# Patient Record
Sex: MALE | Race: WHITE | Employment: PART TIME | URBAN - METROPOLITAN AREA
[De-identification: names, ages, dates, MRNs, and addresses within clinical notes are randomized per-mention and may not be internally consistent; named-entity substitution may affect disease eponyms.]

---

## 2021-04-28 ENCOUNTER — HOSPITAL ENCOUNTER (OUTPATIENT)
Age: 41
Discharge: HOME | End: 2021-04-30
Payer: COMMERCIAL

## 2021-04-28 DIAGNOSIS — U07.1 COVID-19: ICD-10-CM

## 2021-04-28 PROCEDURE — U0005 INFEC AGEN DETEC AMPLI PROBE: HCPCS

## 2021-04-28 PROCEDURE — U0003 INFECTIOUS AGENT DETECTION BY NUCLEIC ACID (DNA OR RNA); SEVERE ACUTE RESPIRATORY SYNDROME CORONAVIRUS 2 (SARS-COV-2) (CORONAVIRUS DISEASE [COVID-19]), AMPLIFIED PROBE TECHNIQUE, MAKING USE OF HIGH THROUGHPUT TECHNOLOGIES AS DESCRIBED BY CMS-2020-01-R: HCPCS

## 2021-04-30 LAB — SARS-COV-2, PCR: NOT DETECTED

## 2021-05-03 ENCOUNTER — HOSPITAL ENCOUNTER (OUTPATIENT)
Age: 41
Setting detail: OUTPATIENT SURGERY
Discharge: HOME | End: 2021-05-03
Attending: SURGERY | Admitting: SURGERY
Payer: COMMERCIAL

## 2021-05-03 VITALS
WEIGHT: 215 LBS | DIASTOLIC BLOOD PRESSURE: 79 MMHG | TEMPERATURE: 98 F | OXYGEN SATURATION: 96 % | HEIGHT: 70 IN | SYSTOLIC BLOOD PRESSURE: 135 MMHG | HEART RATE: 61 BPM | BODY MASS INDEX: 30.78 KG/M2 | RESPIRATION RATE: 16 BRPM

## 2021-05-03 DIAGNOSIS — U07.1 COVID-19: Primary | ICD-10-CM

## 2021-05-03 PROCEDURE — 7100000010 HC PHASE II RECOVERY - FIRST 15 MIN: Performed by: SURGERY

## 2021-05-03 PROCEDURE — 88305 TISSUE EXAM BY PATHOLOGIST: CPT

## 2021-05-03 PROCEDURE — 3609010600 HC COLONOSCOPY POLYPECTOMY SNARE/COLD BIOPSY: Performed by: SURGERY

## 2021-05-03 PROCEDURE — 3609027000 HC COLONOSCOPY: Performed by: SURGERY

## 2021-05-03 PROCEDURE — 99153 MOD SED SAME PHYS/QHP EA: CPT | Performed by: SURGERY

## 2021-05-03 PROCEDURE — 7100000011 HC PHASE II RECOVERY - ADDTL 15 MIN: Performed by: SURGERY

## 2021-05-03 PROCEDURE — 6360000002 HC RX W HCPCS: Performed by: SURGERY

## 2021-05-03 PROCEDURE — 2709999900 HC NON-CHARGEABLE SUPPLY: Performed by: SURGERY

## 2021-05-03 PROCEDURE — 99152 MOD SED SAME PHYS/QHP 5/>YRS: CPT | Performed by: SURGERY

## 2021-05-03 PROCEDURE — 3609019800 HC COLONOSCOPY WITH SUBMUCOSAL INJECTION: Performed by: SURGERY

## 2021-05-03 RX ORDER — SODIUM CHLORIDE 0.9 % (FLUSH) 0.9 %
5-40 SYRINGE (ML) INJECTION EVERY 12 HOURS SCHEDULED
Status: DISCONTINUED | OUTPATIENT
Start: 2021-05-03 | End: 2021-05-03 | Stop reason: HOSPADM

## 2021-05-03 RX ORDER — SODIUM CHLORIDE 9 MG/ML
25 INJECTION, SOLUTION INTRAVENOUS PRN
Status: DISCONTINUED | OUTPATIENT
Start: 2021-05-03 | End: 2021-05-03 | Stop reason: HOSPADM

## 2021-05-03 RX ORDER — MEPERIDINE HYDROCHLORIDE 50 MG/ML
INJECTION INTRAMUSCULAR; INTRAVENOUS; SUBCUTANEOUS PRN
Status: DISCONTINUED | OUTPATIENT
Start: 2021-05-03 | End: 2021-05-03 | Stop reason: ALTCHOICE

## 2021-05-03 RX ORDER — SODIUM CHLORIDE 0.9 % (FLUSH) 0.9 %
5-40 SYRINGE (ML) INJECTION PRN
Status: DISCONTINUED | OUTPATIENT
Start: 2021-05-03 | End: 2021-05-03 | Stop reason: HOSPADM

## 2021-05-03 RX ORDER — SODIUM CHLORIDE 9 MG/ML
INJECTION, SOLUTION INTRAVENOUS CONTINUOUS
Status: DISCONTINUED | OUTPATIENT
Start: 2021-05-03 | End: 2021-05-03 | Stop reason: HOSPADM

## 2021-05-03 RX ORDER — MIDAZOLAM HYDROCHLORIDE 1 MG/ML
INJECTION INTRAMUSCULAR; INTRAVENOUS PRN
Status: DISCONTINUED | OUTPATIENT
Start: 2021-05-03 | End: 2021-05-03 | Stop reason: ALTCHOICE

## 2021-05-03 ASSESSMENT — PAIN SCALES - GENERAL: PAINLEVEL_OUTOF10: 0

## 2021-05-03 NOTE — DISCHARGE INSTR - COC
Continuity of Care Form    Patient Name: Neelam Ramírez   :  1980  MRN:  [id number]    Admit date:  5/3/2021  Discharge date:  ***    Code Status Order: Full Code   Advance Directives:   [facility] Documentation       Date/Time Healthcare Directive Type of Healthcare Directive Copy in [address] Agent's Name Healthcare Agent's Phone Number    [phone]  No, patient does not have an advance directive for healthcare treatment -- -- -- -- --            Admitting Physician:  Brandi Pat MD  PCP: Talat Moctezuma DO    Discharging Nurse: [facility] Unit/Room#: [facility]  Discharging Unit Phone Number: ***    Emergency Contact:   Extended Emergency Contact Information  Primary Emergency Contact: QuezadaMora  Address: [address] [place] [address],[address], [address] Phone: [phone]  Relation: Parent  Secondary Emergency Contact: None,Per Pt  Relation: Other    Past Surgical History:  Past Surgical History:   Procedure Laterality Date    ANKLE SURGERY      fusion       Immunization History: There is no immunization history on file for this patient.     Active Problems:  Patient Active Problem List   Diagnosis Code    Epilepsy ([facility] [address] G40.909    History of drug abuse ([facility] [address] F19.11    Non-compliance Z91.19       Isolation/Infection:   Isolation            No Isolation          Patient Infection Status       Infection Onset Added Last Indicated Last Indicated By Review Planned Expiration Resolved Resolved By    None active    Resolved    COVID-19 Rule Out 21 Covid-19 Ambulatory (Ordered)   21 Rule-Out Test Resulted            Nurse Assessment:  Last Vital Signs: BP (!) 148/88   Pulse 75   Temp 97.8 °F (36.6 °C) (Temporal)   Resp 16   Ht 5' 10\" (1.778 m)   Wt 215 lb (97.5 kg)   SpO2 98%   BMI 30.85 kg/m²     Last documented pain score (0-10 scale): Pain Level: 0  Last Weight:   Wt Readings from Last 1 Encounters:   21 215 lb (97.5 kg)     Mental Status:  {IP PT MENTAL STATUS::::0}    IV Access:  { BUDDY IV ACCESS:765133216:::0}    Nursing Mobility/ADLs:  Walking   {CHP DME ADLs:961945610:::0}  Transfer  {CHP DME ADLs:848780623:::0}  Bathing  {CHP DME ADLs:693260534:::0}  Dressing  {CHP DME ADLs:701147424:::0}  Toileting  {CHP DME ADLs:454010910:::0}  Feeding  {CHP DME ADLs:481090742:::0}  Med Admin  {CHP DME ADLs:247017775:::0}  Med Delivery   { BUDDY MED Delivery:031029152:::0}    Wound Care Documentation and Therapy:        Elimination:  Continence: Bowel: {YES / ME:30350}  Bladder: {YES / SR:55379}  Urinary Catheter: {Urinary Catheter:265790139:::0}   Colostomy/Ileostomy/Ileal Conduit: {YES / CP:96641}       Date of Last BM: ***  No intake or output data in the 24 hours ending 21 1111  No intake/output data recorded.     Safety Concerns:     [address] [employer] Safety Concerns:528361581:::0}    Impairments/Disabilities:      [employer] Impairments/Disabilities:464936765:::0}    Nutrition Therapy:  Current Nutrition Therapy:   [employer] Diet List:948048053:::0}    Routes of Feeding: {CHP DME Other Feedings:795746511:::0}  Liquids: {Slp liquid thickness:93915}  Daily Fluid Restriction: {CHP DME Yes amt example:746078043:::0}  Last Modified Barium Swallow with Video (Video Swallowing Test): {Done Not Done PGBB:333737062:::0}    Treatments at the Time of Hospital Discharge:   Respiratory Treatments: ***  Oxygen Therapy:  {Therapy; copd oxygen:87719:::0}  Ventilator:    { CC Vent List:453979227:::0}    Rehab Therapies: {THERAPEUTIC INTERVENTION:3069847660}  Weight Bearing Status/Restrictions: Pam TRISTAN Weight Bearin:::0}  Other Medical Equipment (for information only, NOT a DME order):  {EQUIPMENT:328624446}  Other Treatments: ***    Patient's personal belongings (please select all that are sent with patient):  {CHP DME Belongings:029008951:::0}    RN SIGNATURE: {Esignature:036745537:::0}    CASE MANAGEMENT/SOCIAL WORK SECTION    Inpatient Status Date: ***    Readmission Risk Assessment Score:  Readmission Risk              Risk of Unplanned Readmission:        0           Discharging to Facility/ Agency   Name:   Address:  Phone:  Fax:    Dialysis Facility (if applicable)   Name:  Address:  Dialysis Schedule:  Phone:  Fax:    / signature: {Esignature:321433089:::0}    PHYSICIAN SECTION    Prognosis: {Prognosis:6385660626:::0}    Condition at Discharge: [address] [place] [address] Patient Condition:318218640:::0}    Rehab Potential (if transferring to Rehab): {Prognosis:7853595563:::0}    Recommended Labs or Other Treatments After Discharge: ***    Physician Certification: I certify the above information and transfer of Abdoul Hunt  is necessary for the continuing treatment of the diagnosis listed and that he requires {Admit to Appropriate Level of Care:55450:::0} for {GREATER/LESS:438258926} 30 days.      Update Admission H&P: {CHP DME Changes in HandP:142580589:::0}    PHYSICIAN SIGNATURE:  {Esignature:455165610:::0}

## 2021-05-03 NOTE — DISCHARGE INSTR - DIET

## 2021-05-03 NOTE — PROGRESS NOTES
Covid Test done: 04/28/2021    Results: Not detected    Self-quarantine guidelines followed since tested? Yes    Any unusual S/S or concerns expressed or observed?  No

## 2021-05-03 NOTE — BRIEF OP NOTE
Brief Postoperative Note      Patient: Neelam Ramírez  YOB: 1980  MRN: [id number]    Date of Procedure: 5/3/2021    Pre-Op Diagnosis: RECTAL BLEED    Post-Op Diagnosis: Same       Procedure(s):  COLONOSCOPY DIAGNOSTIC BOTOX INJECTION    Surgeon(s):  Brandi Pat MD    Assistant:  * No surgical staff found *    Anesthesia: IV Sedation    Estimated Blood Loss (mL): Minimal    Complications: None    Specimens:   ID Type Source Tests Collected by Time Destination   A : Cecal Polyp Tissue Colon SURGICAL PATHOLOGY Brandi Pat MD 5/3/2021 1053        Implants:  * No implants in log *      Drains: * No LDAs found *    Findings: cecal biopsies and rectal Botox injection 100 U 3 split doses    Electronically signed by Brandi Pat MD on 5/3/2021 at 11:08 AM

## 2021-05-03 NOTE — PRE SEDATION
Sedation Pre-Procedure Note    Patient Name: Renee Reece   YOB: 1980  Room/Bed: [facility] ROOM/NONE  Medical Record Number: [id number]  Date: 5/3/2021   Time: 11:07 AM       Indication:  GI bleed and fissures    Consent: I have discussed with the patient and/or the patient representative the indication, alternatives, and the possible risks and/or complications of the planned procedure and the anesthesia methods. The patient and/or patient representative appear to understand and agree to proceed. Vital Signs:   Vitals:    05/03/21 0937   BP: (!) 148/88   Pulse: 75   Resp: 16   Temp: 97.8 °F (36.6 °C)   SpO2: 98%       Past Medical History:   has a past medical history of Seizures ([facility] [address] Past Surgical History:   has a past surgical history that includes Ankle surgery. Medications:   Scheduled Meds:    sodium chloride flush  5-40 mL Intravenous 2 times per day    onabotulinumtoxin A  100 Units Intramuscular Once     Continuous Infusions:    sodium chloride      sodium chloride       PRN Meds: sodium chloride, sodium chloride flush  Home Meds:   Prior to Admission medications    Medication Sig Start Date End Date Taking? Authorizing Provider   lacosamide (VIMPAT) 100 MG TABS tablet TAKE TWO TABLETS BY MOUTH TWICE DAILY 3/4/14  Yes Abad Lee MD     Coumadin Use Last 7 Days:  no  Antiplatelet drug therapy use last 7 days: no  Other anticoagulant use last 7 days: no  Additional Medication Information:  none      Pre-Sedation Documentation and Exam:   I have personally completed a history, physical exam & review of systems for this patient (see notes).     Mallampati Airway Assessment:  normal    Prior History of Anesthesia Complications:   none    ASA Classification:  Class 2 - A normal healthy patient with mild systemic disease    Sedation/ Anesthesia Plan:   intravenous sedation    Medications Planned:   midazolam (Versed)  intravenously    Patient is an appropriate candidate for plan of sedation: yes    Electronically signed by Jean Marie Cardoso MD on 5/3/2021 at 11:07 AM

## 2021-05-04 NOTE — PROCEDURES
[address]                  [address] [place],  [facility]                                 PROCEDURE NOTE    PATIENT NAME: Celso Hallman                    :        1980  MED REC NO:   [id number]                            ROOM:  ACCOUNT NO:   [de-identified]                           ADMIT DATE: 2021  PROVIDER:     Andrei Cheema MD    DATE OF PROCEDURE:  2021    SURGEON:  Andrei Cheema MD    PREPROCEDURE DIAGNOSIS:  Rectal bleed secondary to anal fissures. PROCEDURES:  Consisted of colonoscopy, cecal biopsies, and Botox  injection three-quadrant to the circular rectal area. DESCRIPTION OF PROCEDURE:  The procedure was carried out under awake  sedation. The patient left in supine position. Rectal examination was  performed, but could not be completed due to the discomfort. After  adequate lubrication, the colonoscope was then introduced and progressed  without difficulty through sigmoid, descending, transverse and ascending  colon. There were mild inflammatory changes noted in the ascending area  and biopsies were taken. Ileocecal valve was well visualized and with  the patient in supine position, endoscope was progressively withdrawn  with similar findings. After which 100 units of Botox were split in  three portions injected in the posterior anal area as well as the right  lateral and left lateral for a total of 100 units. The patient  tolerated the procedure well, and the patient was discharged from the  endoscopy suite in stable condition to be monitored as an outpatient for  further management as needed.   EBL 0      Linda Keen MD    D: 2021 15:43:16       T: 2021 15:46:51     [email or web address]  Job#: [id number]     Doc#: [id number]    CC:

## 2021-10-29 ENCOUNTER — HOSPITAL ENCOUNTER (OUTPATIENT)
Age: 41
Discharge: HOME | End: 2021-10-31
Payer: COMMERCIAL

## 2021-10-29 DIAGNOSIS — Z01.818 PREOP TESTING: ICD-10-CM

## 2021-10-29 PROCEDURE — U0003 INFECTIOUS AGENT DETECTION BY NUCLEIC ACID (DNA OR RNA); SEVERE ACUTE RESPIRATORY SYNDROME CORONAVIRUS 2 (SARS-COV-2) (CORONAVIRUS DISEASE [COVID-19]), AMPLIFIED PROBE TECHNIQUE, MAKING USE OF HIGH THROUGHPUT TECHNOLOGIES AS DESCRIBED BY CMS-2020-01-R: HCPCS

## 2021-10-31 LAB — SARS-COV-2, PCR: NOT DETECTED

## 2021-11-01 NOTE — PROGRESS NOTES
Have you been tested for COVID  Yes      negative     Have you been told you were positive for COVID No  Have you had any known exposure to someone that is positive for COVID No  Do you have a cough                   No              Do you have shortness of breath No                 Do you have a sore throat            No                Are you having chills                    No                Are you having muscle aches. No                    Please come to the hospital wearing a mask and have your significant other wear a mask as well. Both of you should check your temperature before leaving to come here,  if it is 100 or higher please call [phone] for instruction.

## 2021-11-01 NOTE — PROGRESS NOTES
procedure to notify you if your arrival time changes    [x] If you receive a survey after surgery we would greatly appreciate your comments    [] Parent/guardian of a minor must accompany their child and remain on the premises  the entire time they are under our care     [] Pediatric patients may bring favorite toy, blanket or comfort item with them    [] A caregiver or family member must remain with the patient during their stay if they are mentally handicapped, have dementia, disoriented or unable to use a call light or would be a safety concern if left unattended    [x] Please notify surgeon if you develop any illness between now and time of surgery (cold, cough, sore throat, fever, nausea, vomiting) or any signs of infections  including skin, wounds, and dental.    [x]  The Outpatient Pharmacy is available to fill your prescription here on your day of surgery, ask your preop nurse for details    [x] Other instructions: wear loose, comfortable clothing  EDUCATIONAL MATERIALS PROVIDED:    [] PAT Preoperative Education Packet/Booklet     [] Medication List    [] Transfusion bracelet applied with instructions    [] Shower with soap, lather and rinse well, and use CHG wipes provided the evening before surgery as instructed    [] Incentive spirometer with instructions

## 2021-11-03 ENCOUNTER — HOSPITAL ENCOUNTER (OUTPATIENT)
Age: 41
Setting detail: OBSERVATION
Discharge: HOME | End: 2021-11-04
Attending: ORTHOPAEDIC SURGERY | Admitting: ORTHOPAEDIC SURGERY
Payer: COMMERCIAL

## 2021-11-03 ENCOUNTER — HOSPITAL ENCOUNTER (OUTPATIENT)
Dept: GENERAL RADIOLOGY | Age: 41
Discharge: HOME | End: 2021-11-05
Attending: ORTHOPAEDIC SURGERY
Payer: COMMERCIAL

## 2021-11-03 ENCOUNTER — ANESTHESIA EVENT (OUTPATIENT)
Dept: OPERATING ROOM | Age: 41
End: 2021-11-03
Payer: COMMERCIAL

## 2021-11-03 ENCOUNTER — ANESTHESIA (OUTPATIENT)
Dept: OPERATING ROOM | Age: 41
End: 2021-11-03
Payer: COMMERCIAL

## 2021-11-03 VITALS — DIASTOLIC BLOOD PRESSURE: 77 MMHG | SYSTOLIC BLOOD PRESSURE: 167 MMHG | OXYGEN SATURATION: 92 % | TEMPERATURE: 98.1 F

## 2021-11-03 DIAGNOSIS — G89.18 POST-OP PAIN: ICD-10-CM

## 2021-11-03 DIAGNOSIS — R52 PAIN: ICD-10-CM

## 2021-11-03 DIAGNOSIS — Z01.818 PREOP TESTING: Primary | ICD-10-CM

## 2021-11-03 PROBLEM — M19.071 ARTHRITIS OF RIGHT FOOT: Status: ACTIVE | Noted: 2021-11-03

## 2021-11-03 LAB
EKG ATRIAL RATE: 59 BPM
EKG P AXIS: 9 DEGREES
EKG P-R INTERVAL: 156 MS
EKG Q-T INTERVAL: 410 MS
EKG QRS DURATION: 92 MS
EKG QTC CALCULATION (BAZETT): 405 MS
EKG R AXIS: 19 DEGREES
EKG T AXIS: 30 DEGREES
EKG VENTRICULAR RATE: 59 BPM
HCT VFR BLD CALC: 47.8 % (ref 37–54)
HEMOGLOBIN: 16.6 G/DL (ref 12.5–16.5)
MCH RBC QN AUTO: 31.5 PG (ref 26–35)
MCHC RBC AUTO-ENTMCNC: 34.7 % (ref 32–34.5)
MCV RBC AUTO: 90.7 FL (ref 80–99.9)
PDW BLD-RTO: 11.7 FL (ref 11.5–15)
PLATELET # BLD: 278 E9/L (ref 130–450)
PMV BLD AUTO: 9 FL (ref 7–12)
RBC # BLD: 5.27 E12/L (ref 3.8–5.8)
WBC # BLD: 7.8 E9/L (ref 4.5–11.5)

## 2021-11-03 PROCEDURE — 76942 ECHO GUIDE FOR BIOPSY: CPT | Performed by: ANESTHESIOLOGY

## 2021-11-03 PROCEDURE — 3600000014 HC SURGERY LEVEL 4 ADDTL 15MIN: Performed by: ORTHOPAEDIC SURGERY

## 2021-11-03 PROCEDURE — 3700000001 HC ADD 15 MINUTES (ANESTHESIA): Performed by: ORTHOPAEDIC SURGERY

## 2021-11-03 PROCEDURE — 93005 ELECTROCARDIOGRAM TRACING: CPT

## 2021-11-03 PROCEDURE — 64445 NJX AA&/STRD SCIATIC NRV IMG: CPT | Performed by: ANESTHESIOLOGY

## 2021-11-03 PROCEDURE — G0378 HOSPITAL OBSERVATION PER HR: HCPCS

## 2021-11-03 PROCEDURE — 3209999900 FLUORO FOR SURGICAL PROCEDURES

## 2021-11-03 PROCEDURE — 7100000000 HC PACU RECOVERY - FIRST 15 MIN: Performed by: ORTHOPAEDIC SURGERY

## 2021-11-03 PROCEDURE — C1769 GUIDE WIRE: HCPCS | Performed by: ORTHOPAEDIC SURGERY

## 2021-11-03 PROCEDURE — 2500000003 HC RX 250 WO HCPCS: Performed by: ANESTHESIOLOGY

## 2021-11-03 PROCEDURE — 2720000002 HC MISC SURG SUPPLY STERILE >$500: Performed by: ORTHOPAEDIC SURGERY

## 2021-11-03 PROCEDURE — 85027 COMPLETE CBC AUTOMATED: CPT

## 2021-11-03 PROCEDURE — 6360000002 HC RX W HCPCS: Performed by: ORTHOPAEDIC SURGERY

## 2021-11-03 PROCEDURE — 2720000010 HC SURG SUPPLY STERILE: Performed by: ORTHOPAEDIC SURGERY

## 2021-11-03 PROCEDURE — 2580000003 HC RX 258: Performed by: NURSE ANESTHETIST, CERTIFIED REGISTERED

## 2021-11-03 PROCEDURE — 3600000004 HC SURGERY LEVEL 4 BASE: Performed by: ORTHOPAEDIC SURGERY

## 2021-11-03 PROCEDURE — C1713 ANCHOR/SCREW BN/BN,TIS/BN: HCPCS | Performed by: ORTHOPAEDIC SURGERY

## 2021-11-03 PROCEDURE — 6370000000 HC RX 637 (ALT 250 FOR IP): Performed by: ANESTHESIOLOGY

## 2021-11-03 PROCEDURE — 2580000003 HC RX 258: Performed by: ORTHOPAEDIC SURGERY

## 2021-11-03 PROCEDURE — 36415 COLL VENOUS BLD VENIPUNCTURE: CPT

## 2021-11-03 PROCEDURE — 6360000002 HC RX W HCPCS: Performed by: ANESTHESIOLOGY

## 2021-11-03 PROCEDURE — 7100000001 HC PACU RECOVERY - ADDTL 15 MIN: Performed by: ORTHOPAEDIC SURGERY

## 2021-11-03 PROCEDURE — 6360000002 HC RX W HCPCS: Performed by: NURSE ANESTHETIST, CERTIFIED REGISTERED

## 2021-11-03 PROCEDURE — C1729 CATH, DRAINAGE: HCPCS | Performed by: ORTHOPAEDIC SURGERY

## 2021-11-03 PROCEDURE — 3700000000 HC ANESTHESIA ATTENDED CARE: Performed by: ORTHOPAEDIC SURGERY

## 2021-11-03 PROCEDURE — 2709999900 HC NON-CHARGEABLE SUPPLY: Performed by: ORTHOPAEDIC SURGERY

## 2021-11-03 PROCEDURE — 2500000003 HC RX 250 WO HCPCS: Performed by: NURSE ANESTHETIST, CERTIFIED REGISTERED

## 2021-11-03 PROCEDURE — 6370000000 HC RX 637 (ALT 250 FOR IP): Performed by: ORTHOPAEDIC SURGERY

## 2021-11-03 DEVICE — SCREW BNE L80MM DIA7MM XL HDLSS COMPR FULL THRD: Type: IMPLANTABLE DEVICE | Status: FUNCTIONAL

## 2021-11-03 DEVICE — SCREW BNE XL L75MM DIA7MM HDLSS COMPR FULL THRD: Type: IMPLANTABLE DEVICE | Status: FUNCTIONAL

## 2021-11-03 DEVICE — IMPLANTABLE DEVICE: Type: IMPLANTABLE DEVICE | Status: FUNCTIONAL

## 2021-11-03 DEVICE — STAPLE BONE FIX W20XL20MM NIT COMPR W/ INSTRMT LO PROF FOR: Type: IMPLANTABLE DEVICE | Site: ANKLE | Status: FUNCTIONAL

## 2021-11-03 RX ORDER — MORPHINE SULFATE 2 MG/ML
2 INJECTION, SOLUTION INTRAMUSCULAR; INTRAVENOUS EVERY 4 HOURS PRN
Status: DISCONTINUED | OUTPATIENT
Start: 2021-11-03 | End: 2021-11-04 | Stop reason: HOSPADM

## 2021-11-03 RX ORDER — ROPIVACAINE HYDROCHLORIDE 5 MG/ML
INJECTION, SOLUTION EPIDURAL; INFILTRATION; PERINEURAL
Status: COMPLETED | OUTPATIENT
Start: 2021-11-03 | End: 2021-11-03

## 2021-11-03 RX ORDER — ONDANSETRON 2 MG/ML
4 INJECTION INTRAMUSCULAR; INTRAVENOUS EVERY 6 HOURS PRN
Status: DISCONTINUED | OUTPATIENT
Start: 2021-11-03 | End: 2021-11-04 | Stop reason: HOSPADM

## 2021-11-03 RX ORDER — SODIUM CHLORIDE 0.9 % (FLUSH) 0.9 %
5-40 SYRINGE (ML) INJECTION EVERY 12 HOURS SCHEDULED
Status: DISCONTINUED | OUTPATIENT
Start: 2021-11-03 | End: 2021-11-04 | Stop reason: HOSPADM

## 2021-11-03 RX ORDER — LACOSAMIDE 100 MG/1
200 TABLET ORAL 2 TIMES DAILY
Status: DISCONTINUED | OUTPATIENT
Start: 2021-11-03 | End: 2021-11-04 | Stop reason: HOSPADM

## 2021-11-03 RX ORDER — SODIUM CHLORIDE 9 MG/ML
25 INJECTION, SOLUTION INTRAVENOUS PRN
Status: DISCONTINUED | OUTPATIENT
Start: 2021-11-03 | End: 2021-11-04 | Stop reason: HOSPADM

## 2021-11-03 RX ORDER — LIDOCAINE HYDROCHLORIDE 20 MG/ML
INJECTION, SOLUTION INFILTRATION; PERINEURAL PRN
Status: DISCONTINUED | OUTPATIENT
Start: 2021-11-03 | End: 2021-11-03 | Stop reason: SDUPTHER

## 2021-11-03 RX ORDER — HYDROCODONE BITARTRATE AND ACETAMINOPHEN 5; 325 MG/1; MG/1
1 TABLET ORAL EVERY 6 HOURS PRN
Qty: 28 TABLET | Refills: 0 | Status: SHIPPED | OUTPATIENT
Start: 2021-11-03 | End: 2021-11-10

## 2021-11-03 RX ORDER — SODIUM CHLORIDE 0.9 % (FLUSH) 0.9 %
5-40 SYRINGE (ML) INJECTION PRN
Status: DISCONTINUED | OUTPATIENT
Start: 2021-11-03 | End: 2021-11-04 | Stop reason: HOSPADM

## 2021-11-03 RX ORDER — SODIUM CHLORIDE 9 MG/ML
INJECTION, SOLUTION INTRAVENOUS CONTINUOUS PRN
Status: DISCONTINUED | OUTPATIENT
Start: 2021-11-03 | End: 2021-11-03 | Stop reason: SDUPTHER

## 2021-11-03 RX ORDER — ONDANSETRON 2 MG/ML
INJECTION INTRAMUSCULAR; INTRAVENOUS PRN
Status: DISCONTINUED | OUTPATIENT
Start: 2021-11-03 | End: 2021-11-03 | Stop reason: SDUPTHER

## 2021-11-03 RX ORDER — ROCURONIUM BROMIDE 10 MG/ML
INJECTION, SOLUTION INTRAVENOUS PRN
Status: DISCONTINUED | OUTPATIENT
Start: 2021-11-03 | End: 2021-11-03 | Stop reason: SDUPTHER

## 2021-11-03 RX ORDER — MEPERIDINE HYDROCHLORIDE 25 MG/ML
12.5 INJECTION INTRAMUSCULAR; INTRAVENOUS; SUBCUTANEOUS EVERY 5 MIN PRN
Status: DISCONTINUED | OUTPATIENT
Start: 2021-11-03 | End: 2021-11-03 | Stop reason: HOSPADM

## 2021-11-03 RX ORDER — ACETAMINOPHEN 325 MG/1
650 TABLET ORAL EVERY 4 HOURS PRN
Status: DISCONTINUED | OUTPATIENT
Start: 2021-11-03 | End: 2021-11-04 | Stop reason: HOSPADM

## 2021-11-03 RX ORDER — FENTANYL CITRATE 50 UG/ML
INJECTION, SOLUTION INTRAMUSCULAR; INTRAVENOUS PRN
Status: DISCONTINUED | OUTPATIENT
Start: 2021-11-03 | End: 2021-11-03 | Stop reason: SDUPTHER

## 2021-11-03 RX ORDER — OXYCODONE HYDROCHLORIDE 5 MG/1
5 TABLET ORAL EVERY 4 HOURS PRN
Status: DISCONTINUED | OUTPATIENT
Start: 2021-11-03 | End: 2021-11-04 | Stop reason: HOSPADM

## 2021-11-03 RX ORDER — MIDAZOLAM HYDROCHLORIDE 2 MG/2ML
0.5 INJECTION, SOLUTION INTRAMUSCULAR; INTRAVENOUS PRN
Status: DISCONTINUED | OUTPATIENT
Start: 2021-11-03 | End: 2021-11-03 | Stop reason: HOSPADM

## 2021-11-03 RX ORDER — ROPIVACAINE HYDROCHLORIDE 5 MG/ML
30 INJECTION, SOLUTION EPIDURAL; INFILTRATION; PERINEURAL
Status: DISCONTINUED | OUTPATIENT
Start: 2021-11-03 | End: 2021-11-03 | Stop reason: HOSPADM

## 2021-11-03 RX ORDER — DEXAMETHASONE SODIUM PHOSPHATE 4 MG/ML
INJECTION, SOLUTION INTRA-ARTICULAR; INTRALESIONAL; INTRAMUSCULAR; INTRAVENOUS; SOFT TISSUE PRN
Status: DISCONTINUED | OUTPATIENT
Start: 2021-11-03 | End: 2021-11-03 | Stop reason: SDUPTHER

## 2021-11-03 RX ORDER — PROPOFOL 10 MG/ML
INJECTION, EMULSION INTRAVENOUS PRN
Status: DISCONTINUED | OUTPATIENT
Start: 2021-11-03 | End: 2021-11-03 | Stop reason: SDUPTHER

## 2021-11-03 RX ORDER — ONDANSETRON 4 MG/1
4 TABLET, ORALLY DISINTEGRATING ORAL EVERY 8 HOURS PRN
Status: DISCONTINUED | OUTPATIENT
Start: 2021-11-03 | End: 2021-11-04 | Stop reason: HOSPADM

## 2021-11-03 RX ORDER — PROCHLORPERAZINE EDISYLATE 5 MG/ML
5 INJECTION INTRAMUSCULAR; INTRAVENOUS
Status: DISCONTINUED | OUTPATIENT
Start: 2021-11-03 | End: 2021-11-03 | Stop reason: HOSPADM

## 2021-11-03 RX ORDER — METOCLOPRAMIDE HYDROCHLORIDE 5 MG/ML
10 INJECTION INTRAMUSCULAR; INTRAVENOUS ONCE
Status: COMPLETED | OUTPATIENT
Start: 2021-11-03 | End: 2021-11-03

## 2021-11-03 RX ORDER — OXYCODONE HYDROCHLORIDE 5 MG/1
10 TABLET ORAL EVERY 4 HOURS PRN
Status: DISCONTINUED | OUTPATIENT
Start: 2021-11-03 | End: 2021-11-04 | Stop reason: HOSPADM

## 2021-11-03 RX ORDER — FENTANYL CITRATE 50 UG/ML
25 INJECTION, SOLUTION INTRAMUSCULAR; INTRAVENOUS PRN
Status: DISCONTINUED | OUTPATIENT
Start: 2021-11-03 | End: 2021-11-03 | Stop reason: HOSPADM

## 2021-11-03 RX ORDER — DIPHENHYDRAMINE HYDROCHLORIDE 50 MG/ML
12.5 INJECTION INTRAMUSCULAR; INTRAVENOUS
Status: DISCONTINUED | OUTPATIENT
Start: 2021-11-03 | End: 2021-11-03 | Stop reason: HOSPADM

## 2021-11-03 RX ORDER — POLYETHYLENE GLYCOL 3350 17 G/17G
17 POWDER, FOR SOLUTION ORAL DAILY PRN
Status: DISCONTINUED | OUTPATIENT
Start: 2021-11-03 | End: 2021-11-04 | Stop reason: HOSPADM

## 2021-11-03 RX ORDER — FENTANYL CITRATE 50 UG/ML
25 INJECTION, SOLUTION INTRAMUSCULAR; INTRAVENOUS EVERY 5 MIN PRN
Status: DISCONTINUED | OUTPATIENT
Start: 2021-11-03 | End: 2021-11-03 | Stop reason: HOSPADM

## 2021-11-03 RX ORDER — MORPHINE SULFATE 2 MG/ML
4 INJECTION, SOLUTION INTRAMUSCULAR; INTRAVENOUS EVERY 4 HOURS PRN
Status: DISCONTINUED | OUTPATIENT
Start: 2021-11-03 | End: 2021-11-04 | Stop reason: HOSPADM

## 2021-11-03 RX ORDER — HYDROCODONE BITARTRATE AND ACETAMINOPHEN 5; 325 MG/1; MG/1
1 TABLET ORAL
Status: DISCONTINUED | OUTPATIENT
Start: 2021-11-03 | End: 2021-11-03 | Stop reason: HOSPADM

## 2021-11-03 RX ORDER — ACETAMINOPHEN 500 MG
1000 TABLET ORAL ONCE
Status: COMPLETED | OUTPATIENT
Start: 2021-11-03 | End: 2021-11-03

## 2021-11-03 RX ADMIN — MORPHINE SULFATE 4 MG: 2 INJECTION, SOLUTION INTRAMUSCULAR; INTRAVENOUS at 20:50

## 2021-11-03 RX ADMIN — FENTANYL CITRATE 50 MCG: 50 INJECTION, SOLUTION INTRAMUSCULAR; INTRAVENOUS at 12:03

## 2021-11-03 RX ADMIN — HYDROMORPHONE HYDROCHLORIDE 0.5 MG: 1 INJECTION, SOLUTION INTRAMUSCULAR; INTRAVENOUS; SUBCUTANEOUS at 18:26

## 2021-11-03 RX ADMIN — LACOSAMIDE 200 MG: 100 TABLET, FILM COATED ORAL at 20:41

## 2021-11-03 RX ADMIN — ONDANSETRON 4 MG: 2 INJECTION INTRAMUSCULAR; INTRAVENOUS at 13:31

## 2021-11-03 RX ADMIN — LIDOCAINE HYDROCHLORIDE 60 MG: 20 INJECTION, SOLUTION INFILTRATION; PERINEURAL at 13:31

## 2021-11-03 RX ADMIN — FENTANYL CITRATE 100 MCG: 50 INJECTION, SOLUTION INTRAMUSCULAR; INTRAVENOUS at 13:31

## 2021-11-03 RX ADMIN — ROPIVACAINE HYDROCHLORIDE 25 ML: 5 INJECTION, SOLUTION EPIDURAL; INFILTRATION; PERINEURAL at 12:09

## 2021-11-03 RX ADMIN — ROCURONIUM BROMIDE 50 MG: 10 INJECTION, SOLUTION INTRAVENOUS at 13:31

## 2021-11-03 RX ADMIN — SODIUM CHLORIDE: 9 INJECTION, SOLUTION INTRAVENOUS at 13:23

## 2021-11-03 RX ADMIN — FAMOTIDINE 20 MG: 10 INJECTION, SOLUTION INTRAVENOUS at 11:27

## 2021-11-03 RX ADMIN — Medication 2000 MG: at 13:25

## 2021-11-03 RX ADMIN — SODIUM CHLORIDE: 9 INJECTION, SOLUTION INTRAVENOUS at 16:38

## 2021-11-03 RX ADMIN — ROPIVACAINE HYDROCHLORIDE 20 ML: 5 INJECTION, SOLUTION EPIDURAL; INFILTRATION; PERINEURAL at 12:07

## 2021-11-03 RX ADMIN — Medication 2000 MG: at 17:20

## 2021-11-03 RX ADMIN — PROPOFOL 190 MG: 10 INJECTION, EMULSION INTRAVENOUS at 13:31

## 2021-11-03 RX ADMIN — MIDAZOLAM 2 MG: 1 INJECTION INTRAMUSCULAR; INTRAVENOUS at 12:03

## 2021-11-03 RX ADMIN — ACETAMINOPHEN 1000 MG: 500 TABLET ORAL at 11:26

## 2021-11-03 RX ADMIN — Medication 10 ML: at 21:05

## 2021-11-03 RX ADMIN — DEXAMETHASONE SODIUM PHOSPHATE 8 MG: 4 INJECTION, SOLUTION INTRAMUSCULAR; INTRAVENOUS at 13:31

## 2021-11-03 RX ADMIN — METOCLOPRAMIDE 10 MG: 5 INJECTION, SOLUTION INTRAMUSCULAR; INTRAVENOUS at 11:27

## 2021-11-03 ASSESSMENT — PULMONARY FUNCTION TESTS
PIF_VALUE: 3
PIF_VALUE: 20
PIF_VALUE: 21
PIF_VALUE: 0
PIF_VALUE: 21
PIF_VALUE: 22
PIF_VALUE: 21
PIF_VALUE: 21
PIF_VALUE: 22
PIF_VALUE: 6
PIF_VALUE: 21
PIF_VALUE: 20
PIF_VALUE: 21
PIF_VALUE: 4
PIF_VALUE: 21
PIF_VALUE: 21
PIF_VALUE: 3
PIF_VALUE: 21
PIF_VALUE: 21
PIF_VALUE: 20
PIF_VALUE: 20
PIF_VALUE: 21
PIF_VALUE: 20
PIF_VALUE: 3
PIF_VALUE: 21
PIF_VALUE: 20
PIF_VALUE: 23
PIF_VALUE: 3
PIF_VALUE: 20
PIF_VALUE: 19
PIF_VALUE: 20
PIF_VALUE: 21
PIF_VALUE: 21
PIF_VALUE: 23
PIF_VALUE: 20
PIF_VALUE: 20
PIF_VALUE: 21
PIF_VALUE: 3
PIF_VALUE: 5
PIF_VALUE: 3
PIF_VALUE: 4
PIF_VALUE: 21
PIF_VALUE: 22
PIF_VALUE: 1
PIF_VALUE: 21
PIF_VALUE: 0
PIF_VALUE: 21
PIF_VALUE: 20
PIF_VALUE: 19
PIF_VALUE: 22
PIF_VALUE: 4
PIF_VALUE: 22
PIF_VALUE: 21
PIF_VALUE: 21
PIF_VALUE: 22
PIF_VALUE: 21
PIF_VALUE: 21
PIF_VALUE: 3
PIF_VALUE: 1
PIF_VALUE: 21
PIF_VALUE: 24
PIF_VALUE: 20
PIF_VALUE: 3
PIF_VALUE: 21
PIF_VALUE: 22
PIF_VALUE: 22
PIF_VALUE: 3
PIF_VALUE: 1
PIF_VALUE: 22
PIF_VALUE: 1
PIF_VALUE: 0
PIF_VALUE: 20
PIF_VALUE: 0
PIF_VALUE: 21
PIF_VALUE: 4
PIF_VALUE: 3
PIF_VALUE: 0
PIF_VALUE: 21
PIF_VALUE: 22
PIF_VALUE: 22
PIF_VALUE: 3
PIF_VALUE: 21
PIF_VALUE: 22
PIF_VALUE: 21
PIF_VALUE: 2
PIF_VALUE: 21
PIF_VALUE: 3
PIF_VALUE: 3
PIF_VALUE: 22
PIF_VALUE: 21
PIF_VALUE: 22
PIF_VALUE: 21
PIF_VALUE: 21
PIF_VALUE: 22
PIF_VALUE: 4
PIF_VALUE: 3
PIF_VALUE: 21
PIF_VALUE: 19
PIF_VALUE: 2
PIF_VALUE: 0
PIF_VALUE: 21
PIF_VALUE: 22
PIF_VALUE: 21
PIF_VALUE: 22
PIF_VALUE: 21
PIF_VALUE: 3
PIF_VALUE: 3
PIF_VALUE: 22
PIF_VALUE: 20
PIF_VALUE: 22
PIF_VALUE: 21
PIF_VALUE: 21
PIF_VALUE: 20
PIF_VALUE: 21
PIF_VALUE: 21
PIF_VALUE: 3
PIF_VALUE: 21
PIF_VALUE: 22
PIF_VALUE: 21
PIF_VALUE: 3
PIF_VALUE: 21
PIF_VALUE: 20
PIF_VALUE: 21
PIF_VALUE: 21
PIF_VALUE: 3
PIF_VALUE: 3
PIF_VALUE: 21
PIF_VALUE: 21
PIF_VALUE: 4
PIF_VALUE: 21
PIF_VALUE: 3
PIF_VALUE: 21
PIF_VALUE: 4
PIF_VALUE: 4
PIF_VALUE: 21
PIF_VALUE: 4
PIF_VALUE: 21
PIF_VALUE: 3
PIF_VALUE: 21
PIF_VALUE: 3
PIF_VALUE: 21
PIF_VALUE: 22
PIF_VALUE: 21
PIF_VALUE: 4
PIF_VALUE: 3
PIF_VALUE: 2
PIF_VALUE: 0
PIF_VALUE: 4
PIF_VALUE: 21
PIF_VALUE: 3
PIF_VALUE: 3
PIF_VALUE: 23
PIF_VALUE: 23
PIF_VALUE: 3
PIF_VALUE: 20
PIF_VALUE: 22
PIF_VALUE: 3
PIF_VALUE: 23
PIF_VALUE: 23
PIF_VALUE: 0
PIF_VALUE: 3
PIF_VALUE: 3
PIF_VALUE: 21
PIF_VALUE: 21
PIF_VALUE: 0
PIF_VALUE: 20
PIF_VALUE: 21
PIF_VALUE: 3
PIF_VALUE: 21
PIF_VALUE: 21
PIF_VALUE: 3
PIF_VALUE: 3
PIF_VALUE: 4
PIF_VALUE: 22
PIF_VALUE: 4
PIF_VALUE: 3
PIF_VALUE: 21
PIF_VALUE: 21
PIF_VALUE: 3
PIF_VALUE: 3
PIF_VALUE: 22
PIF_VALUE: 3
PIF_VALUE: 22
PIF_VALUE: 3
PIF_VALUE: 20
PIF_VALUE: 20
PIF_VALUE: 21
PIF_VALUE: 20
PIF_VALUE: 21
PIF_VALUE: 20
PIF_VALUE: 22
PIF_VALUE: 23
PIF_VALUE: 21
PIF_VALUE: 20
PIF_VALUE: 22
PIF_VALUE: 0
PIF_VALUE: 21
PIF_VALUE: 22
PIF_VALUE: 21
PIF_VALUE: 0
PIF_VALUE: 22
PIF_VALUE: 2
PIF_VALUE: 24
PIF_VALUE: 20
PIF_VALUE: 4
PIF_VALUE: 4
PIF_VALUE: 3
PIF_VALUE: 3
PIF_VALUE: 4
PIF_VALUE: 20
PIF_VALUE: 20
PIF_VALUE: 21
PIF_VALUE: 20
PIF_VALUE: 21
PIF_VALUE: 20
PIF_VALUE: 21
PIF_VALUE: 19
PIF_VALUE: 21
PIF_VALUE: 20
PIF_VALUE: 0
PIF_VALUE: 20
PIF_VALUE: 3
PIF_VALUE: 20
PIF_VALUE: 0
PIF_VALUE: 21
PIF_VALUE: 3
PIF_VALUE: 20
PIF_VALUE: 21
PIF_VALUE: 3
PIF_VALUE: 21
PIF_VALUE: 21
PIF_VALUE: 3
PIF_VALUE: 21
PIF_VALUE: 3
PIF_VALUE: 22
PIF_VALUE: 3
PIF_VALUE: 0
PIF_VALUE: 3
PIF_VALUE: 22
PIF_VALUE: 20
PIF_VALUE: 21
PIF_VALUE: 3
PIF_VALUE: 21
PIF_VALUE: 5
PIF_VALUE: 3
PIF_VALUE: 22
PIF_VALUE: 22
PIF_VALUE: 21

## 2021-11-03 ASSESSMENT — PAIN SCALES - GENERAL
PAINLEVEL_OUTOF10: 0
PAINLEVEL_OUTOF10: 8
PAINLEVEL_OUTOF10: 0
PAINLEVEL_OUTOF10: 8
PAINLEVEL_OUTOF10: 0

## 2021-11-03 ASSESSMENT — PAIN DESCRIPTION - DIRECTION: RADIATING_TOWARDS: UP LEG

## 2021-11-03 ASSESSMENT — LIFESTYLE VARIABLES: SMOKING_STATUS: 0

## 2021-11-03 ASSESSMENT — PAIN DESCRIPTION - PROGRESSION: CLINICAL_PROGRESSION: NOT CHANGED

## 2021-11-03 ASSESSMENT — PAIN - FUNCTIONAL ASSESSMENT: PAIN_FUNCTIONAL_ASSESSMENT: PREVENTS OR INTERFERES SOME ACTIVE ACTIVITIES AND ADLS

## 2021-11-03 ASSESSMENT — PAIN DESCRIPTION - ONSET: ONSET: ON-GOING

## 2021-11-03 ASSESSMENT — PAIN DESCRIPTION - PAIN TYPE
TYPE: SURGICAL PAIN
TYPE: SURGICAL PAIN;CHRONIC PAIN

## 2021-11-03 ASSESSMENT — PAIN DESCRIPTION - DESCRIPTORS: DESCRIPTORS: CONSTANT;PRESSURE;POUNDING

## 2021-11-03 ASSESSMENT — PAIN DESCRIPTION - FREQUENCY: FREQUENCY: CONTINUOUS

## 2021-11-03 ASSESSMENT — PAIN DESCRIPTION - ORIENTATION: ORIENTATION: RIGHT

## 2021-11-03 ASSESSMENT — PAIN DESCRIPTION - LOCATION: LOCATION: LEG;FOOT

## 2021-11-03 NOTE — H&P
Department of Orthopedic Surgery  Resident H&P Note          CHIEF COMPLAINT:  Right foot/ankle pain    HISTORY OF PRESENT ILLNESS:                The patient is a 38 y.o. male who presents with right ankle and foot pain which has been persistent for years. Patient has a past history of hindfoot fusion which has lead to progressive degeneration of surrounding joints and deformity within the hindfoot and midfoot. He had followed up with Dr. Marilu Cuevas in office where a surgical procedure was agreed upon. Patient presents today for procedure. Denies prior numbness/tingling/paresthesias. Denies pain elsewhere.       Past Medical History:        Diagnosis Date    Seizures ([facility] [address]     last seizure 10/25/2021     Past Surgical History:        Procedure Laterality Date    ANKLE SURGERY Right     fusion    COLONOSCOPY N/A 5/3/2021    COLONOSCOPY SUBMUCOSAL/BOTOX INJECTION performed by Leon Leon MD at [address] COLONOSCOPY  5/3/2021    COLONOSCOPY POLYPECTOMY SNARE/COLD BIOPSY performed by Leon Leon MD at [facility]     Current Medications:   Current Facility-Administered Medications: fentaNYL (SUBLIMAZE) injection 25 mcg, 25 mcg, IntraVENous, PRN  midazolam PF (VERSED) injection 0.5 mg, 0.5 mg, IntraVENous, PRN  ropivacaine (NAROPIN) 0.5% injection 30 mL, 30 mL, Infiltration, Once PRN  ropivacaine (NAROPIN) 0.5% injection 30 mL, 30 mL, Infiltration, Once PRN  ceFAZolin (ANCEF) 2000 mg in sterile water 20 mL IV syringe, 2,000 mg, IntraVENous, Once  meperidine (DEMEROL) injection 12.5 mg, 12.5 mg, IntraVENous, Q5 Min PRN  fentaNYL (SUBLIMAZE) injection 25 mcg, 25 mcg, IntraVENous, Q5 Min PRN  HYDROmorphone (DILAUDID) injection 0.5 mg, 0.5 mg, IntraVENous, Q5 Min PRN  HYDROmorphone (DILAUDID) injection 0.5 mg, 0.5 mg, IntraVENous, Q5 Min PRN  HYDROcodone-acetaminophen (NORCO) 5-325 MG per tablet 1 tablet, 1 tablet, Oral, Once PRN  prochlorperazine (COMPAZINE) injection 5 mg, 5 mg, IntraVENous, Once PRN  diphenhydrAMINE (BENADRYL) injection 12.5 mg, 12.5 mg, IntraVENous, Once PRN  Allergies:  Patient has no known allergies. Social History:   TOBACCO:   reports that he quit smoking about 4 weeks ago. His smoking use included cigarettes. He has a 18.00 pack-year smoking history. He has never used smokeless tobacco.  ETOH:   reports current alcohol use. DRUGS:   reports current drug use. ACTIVITIES OF DAILY LIVING:    OCCUPATION:    Family History:       Problem Relation Age of Onset    Diabetes Maternal Uncle         x4    Diabetes Maternal Grandmother        REVIEW OF SYSTEMS:  CONSTITUTIONAL:  negative for  fevers, chills  EYES:  negative for blurred vision, visual disturbance  HEENT:  negative for  hearing loss, voice change  RESPIRATORY:  negative for  dyspnea, wheezing  CARDIOVASCULAR:  negative for  chest pain, palpitations  GASTROINTESTINAL:  negative for nausea, vomiting  GENITOURINARY:  negative for frequency, urinary incontinence  HEMATOLOGIC/LYMPHATIC:  negative for bleeding and petechiae  MUSCULOSKELETAL:  positive for  pain  NEUROLOGICAL:  negative for headaches, dizziness  BEHAVIOR/PSYCH:  negative for increased agitation and anxiety    PHYSICAL EXAM:    VITALS:  /75   Pulse 59   Temp 97.9 °F (36.6 °C) (Temporal)   Resp 18   Ht 5' 10\" (1.778 m)   Wt 220 lb (99.8 kg)   SpO2 97%   BMI 31.57 kg/m²   CONSTITUTIONAL:  Awake, alert, answers questions appropriately  EYES:  Lids and lashes normal, pupils equal, round and reactive to light, extra ocular muscles intact  HENT:  Normocephalic, without obvious abnormality, atraumatic, neck supple, symmetric  LUNGS:  No increased work of breathing  CARDIOVASCULAR:  Brisk vascular capillary refill to all extremities  ABDOMEN:  Non-tender, Non-distended  NEUROLOGIC:  Awake, alert, oriented to name, place and time. Cranial nerves II-XII are grossly intact. MUSCULOSKELETAL:  Right lower Extremity:   Skin is intact about the ankle and foot.  There is previous incisional scar noted about the foot/ankle  Obvious fixed deformity of the hindfoot  Compartments soft and compressible  Motion limited at the subtalar joint  +2/4 DP & PT pulses, Brisk Cap refill, Toes warm and perfused  Distal sensation grossly intact to Peroneals, Sural, Saphenous, and tibial nrs        DATA:    CBC:   Lab Results   Component Value Date    WBC 7.8 11/03/2021    RBC 5.27 11/03/2021    HGB 16.6 11/03/2021    HCT 47.8 11/03/2021    MCV 90.7 11/03/2021    MCH 31.5 11/03/2021    MCHC 34.7 11/03/2021    RDW 11.7 11/03/2021     11/03/2021    MPV 9.0 11/03/2021     PT/INR:  No results found for: PROTIME, INR      IMPRESSION:  · 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 Graciela Crawley

## 2021-11-03 NOTE — ANESTHESIA PRE PROCEDURE
Department of Anesthesiology  Preprocedure Note       Name:  Eleazar Rob   Age:  38 y.o.  :  1980                                          MRN:  [id number]         Date:  11/3/2021      Surgeon: Nannette Colon):  Raquel Huff MD    Procedure: Procedure(s):  RIGHT ANKLE HARDWARE REMOVAL OSTEOTOMY SUBTALAR JOINT TRIPLE ARTHRODESIS ACHILLES TENDON LENGTHENING VS GASTROCNEMIUS RESECTION    +++ARTHREX+++  ++PNB++    Medications prior to admission:   Prior to Admission medications    Medication Sig Start Date End Date Taking? Authorizing Provider   Nutritional Supplements (EQUATE PO) Take 1 tablet by mouth daily Stool softener, takes nightly prn   Yes Historical Provider, MD   lacosamide (VIMPAT) 100 MG TABS tablet TAKE TWO TABLETS BY MOUTH TWICE DAILY 3/4/14  Yes Kelli Simpson MD       Current medications:    No current facility-administered medications for this encounter.      Current Outpatient Medications   Medication Sig Dispense Refill    Nutritional Supplements (EQUATE PO) Take 1 tablet by mouth daily Stool softener, takes nightly prn      lacosamide (VIMPAT) 100 MG TABS tablet TAKE TWO TABLETS BY MOUTH TWICE DAILY 180 tablet 5       Allergies:  No Known Allergies    Problem List:    Patient Active Problem List   Diagnosis Code    Epilepsy ([facility] [address] G40.909    History of drug abuse ([facility] [address] F19.11    Non-compliance Z91.19       Past Medical History:        Diagnosis Date    Seizures ([facility] [address]     last seizure 10/25/2021       Past Surgical History:        Procedure Laterality Date    ANKLE SURGERY Right     fusion    COLONOSCOPY N/A 5/3/2021    COLONOSCOPY SUBMUCOSAL/BOTOX INJECTION performed by Alo Avery MD at [facility] COLONOSCOPY  5/3/2021    COLONOSCOPY POLYPECTOMY SNARE/COLD BIOPSY performed by Alo Avery MD at [facility] History:    Social History     Tobacco Use    Smoking status: Former Smoker     Packs/day: 1.00     Years: 18.00     Pack years: 18.00 Types: Cigarettes     Quit date: 10/1/2021     Years since quittin.0    Smokeless tobacco: Never Used   Substance Use Topics    Alcohol use: Yes     Alcohol/week: 0.0 standard drinks     Comment: occasionaly                                Counseling given: Not Answered      Vital Signs (Current):   Vitals:    21 1023   Weight: 220 lb (99.8 kg)   Height: 5' 10\" (1.778 m)                                              BP Readings from Last 3 Encounters:   21 135/79   13 130/60   12 124/90       NPO Status:                                                                                 BMI:   Wt Readings from Last 3 Encounters:   21 215 lb (97.5 kg)   13 222 lb (100.7 kg)   12 230 lb 8 oz (104.6 kg)     Body mass index is 31.57 kg/m². CBC:   Lab Results   Component Value Date    WBC 10.6 2012    RBC 5.27 2012    HGB 16.8 2012    HCT 48.8 2012    MCV 92.6 2012    RDW 12.6 2012     2012       CMP:   Lab Results   Component Value Date     2012    K 3.8 2012     2012    CO2 29 2012    BUN 12 2012    CREATININE 0.8 2012    LABGLOM >60 2012    GLUCOSE 88 2012    CALCIUM 9.8 2012       POC Tests: No results for input(s): POCGLU, POCNA, POCK, POCCL, POCBUN, POCHEMO, POCHCT in the last 72 hours.     Coags: No results found for: PROTIME, INR, APTT    HCG (If Applicable): No results found for: PREGTESTUR, PREGSERUM, HCG, HCGQUANT     ABGs: No results found for: PHART, PO2ART, ORC2IWF, NIV1RAF, BEART, A2EDKSWO     Type & Screen (If Applicable):  No results found for: LABABO, LABRH    Drug/Infectious Status (If Applicable):  No results found for: HIV, HEPCAB    COVID-19 Screening (If Applicable):   Lab Results   Component Value Date    COVID19 Not Detected 10/28/2021           Anesthesia Evaluation  Patient summary reviewed and Nursing notes reviewed no history of anesthetic complications:   Airway: Mallampati: III     Neck ROM: full  Mouth opening: > = 3 FB Dental:          Pulmonary:normal exam  breath sounds clear to auscultation      (-) not a current smoker (Quit 1 month ago)                           Cardiovascular:  Exercise tolerance: good (>4 METS),       (-) valvular problems/murmurs, past MI, CAD, CABG/stent, dysrhythmias,  angina,  CHF, orthopnea, PND and  [place]      Rhythm: regular  Rate: normal           Beta Blocker:  Not on Beta Blocker         Neuro/Psych:   (+) seizures (Epilepsy on Vimpat:  last seizure 10/25/21): no interval change and poorly controlled,             GI/Hepatic/Renal: Neg GI/Hepatic/Renal ROS            Endo/Other:              Pt had no PAT visit        ROS comment: Hx. of drug abuse and medical noncompliance Abdominal:         (-) obese       Vascular: negative vascular ROS. Other Findings:           Anesthesia Plan      general and regional     ASA 3     (Pre-operative FNB at the [facility] + popliteal block with US  Modified RSI with HOB at 30 degrees RT  PONV prophylaxis  30mg Toradol if okay with surgery)  Induction: intravenous. MIPS: Postoperative opioids intended and Prophylactic antiemetics administered. Anesthetic plan and risks discussed with patient. Plan discussed with CRNA. Alyssia Michael DO   11/3/2021      PNB Consent:    Benefits, alternatives and risks of PNBs were discussed with patient. Risks include but are not limited to bleeding, LAST, infection and possible nerve trauma. PNB was recommended and encouraged as part of multi-modal pain therapy. All questions were answered. After consideration of the above, the patient agrees to:   Right Adductor Canal Block and Popliteal Block for management of post-op pain.     Alyssia Michael DO  Staff Anesthesiologist  November 3, 2021  11:11 AM

## 2021-11-03 NOTE — ANESTHESIA PROCEDURE NOTES
Peripheral Block    Patient location during procedure: pre-op  Start time: 11/3/2021 12:05 PM  End time: 11/3/2021 12:10 PM  Staffing  Performed: anesthesiologist   Anesthesiologist: Nelli Murphy DO  Preanesthetic Checklist  Completed: patient identified, IV checked, site marked, risks and benefits discussed, surgical consent, monitors and equipment checked, pre-op evaluation, timeout performed, anesthesia consent given, oxygen available and patient being monitored  Peripheral Block  Patient position: supine  Prep: ChloraPrep  Patient monitoring: IV access, frequent blood pressure checks, continuous pulse ox and cardiac monitor  Block type: Sciatic  Laterality: right  Injection technique: single-shot  Guidance: ultrasound guided  Popliteal  Provider prep: sterile gloves and mask  Needle  Needle type: combined needle/nerve stimulator   Needle gauge: 22 G  Needle length: 10 cm  Needle localization: anatomical landmarks and ultrasound guidance  Assessment  Injection assessment: negative aspiration for heme, no paresthesia on injection and local visualized surrounding nerve on ultrasound  Paresthesia pain: none  Slow fractionated injection: yes  Hemodynamics: stable  Medications Administered  Ropivacaine (NAROPIN) injection 0.5%, 20 mL  Reason for block: post-op pain management and at surgeon's request

## 2021-11-03 NOTE — BRIEF OP NOTE
Brief Postoperative Note      Patient: Wendy Reich  YOB: 1980  MRN: [id number]    Date of Procedure: 11/3/2021    Pre-Op Diagnosis: OSTEOARTHRITIS    Post-Op Diagnosis: Same       Procedure(s):  RIGHT ANKLE HARDWARE REMOVAL OSTEOTOMY SUBTALAR JOINT TRIPLE ARTHRODESIS ACHILLES TENDON LENGTHENING +++ARTHREX+++  ++PNB++    Surgeon(s):  Trevin Lemos MD    Assistant:  Resident: Risa Vera DO    Anesthesia: General    Estimated Blood Loss (mL): less than 50     Complications: None    Specimens:   * No specimens in log *    Implants:  Implant Name Type Inv. Item Serial No.  Lot No. LRB No. Used Action   ALLOSYNC EXPAND DEMINERALIZED CORTICAL FIBERS   [id number]  [id number] Right 1 Implanted   SCREW BNE XL L75MM DIA7MM HDLSS COMPR FULL THRD  SCREW BNE XL L75MM DIA7MM HDLSS COMPR FULL THRD  [employer]  Right 1 Implanted   SCREW BNE L80MM DIA7MM XL HDLSS COMPR FULL THRD  SCREW BNE L80MM DIA7MM XL HDLSS COMPR FULL THRD  [employer]  Right 1 Implanted   SCREW BONE L50MM DIA5MM L HDLSS COMPR FULL THRD  SCREW BONE L50MM DIA5MM L HDLSS COMPR FULL THRD  [employer]  Right 1 Implanted   dynanite supermx staple with intrumentation, 20w x 15l     [id number] Right 1 Implanted   STAPLE BONE FIX [id number] NIT COMPR W/ INSTRMT LO PROF FOR  STAPLE BONE FIX [id number] NIT COMPR W/ [place] FOR  [employer] [id number] Right 1 Implanted   STAPLE BONE FIX [id number] NIT COMPR W/ INSTRMT LO PROF FOR  STAPLE BONE FIX [id number] NIT COMPR W/ [place] FOR  [employer] [id number] Right 1 Implanted         Drains:   Closed/Suction Drain Right; Anterior Accordion (Active)       Findings: see op note    Electronically signed by Risa Vera DO on 11/3/2021 at 6:07 PM

## 2021-11-03 NOTE — ANESTHESIA POSTPROCEDURE EVALUATION
Department of Anesthesiology  Postprocedure Note    Patient: Eduardo Fairchild  MRN: [id number]  YOB: 1980  Date of evaluation: 11/3/2021  Time:  7:29 PM     Procedure Summary     Date: 11/03/21 Room / Location: [facility] OR [address] [place] [address]    Anesthesia Start: 1310 Anesthesia Stop: 1805    Procedure: RIGHT ANKLE HARDWARE REMOVAL OSTEOTOMY SUBTALAR JOINT TRIPLE ARTHRODESIS ACHILLES TENDON LENGTHENING +++ARTHREX+++  ++PNB++ (Right Ankle) Diagnosis: (OSTEOARTHRITIS)    Surgeons: Melanie Morris MD Responsible Provider: Twan Mcdonald DO    Anesthesia Type: general, regional ASA Status: 3          Anesthesia Type: general, regional    Serafin Phase I: Serafin Score: 10    Serafin Phase II:      Last vitals: Reviewed and per EMR flowsheets.        Anesthesia Post Evaluation    Patient location during evaluation: bedside  Patient participation: complete - patient participated  Level of consciousness: awake  Pain score: 1  Airway patency: patent  Nausea & Vomiting: no vomiting and no nausea  Complications: no  Cardiovascular status: hemodynamically stable  Respiratory status: acceptable  Hydration status: stable

## 2021-11-03 NOTE — ANESTHESIA PROCEDURE NOTES
Peripheral Block    Patient location during procedure: procedure area  Start time: 11/3/2021 12:05 PM  End time: 11/3/2021 12:10 PM  Staffing  Performed: anesthesiologist   Anesthesiologist: Noel Escobar DO  Preanesthetic Checklist  Completed: patient identified, IV checked, site marked, risks and benefits discussed, surgical consent, monitors and equipment checked, pre-op evaluation, timeout performed, anesthesia consent given, oxygen available and patient being monitored  Peripheral Block  Patient position: supine  Prep: ChloraPrep  Patient monitoring: continuous pulse ox, cardiac monitor and IV access  Block type: Femoral  Laterality: right  Injection technique: single-shot  Guidance: ultrasound guided  Adductor canal  Provider prep: sterile gloves and mask  Needle  Needle type: combined needle/nerve stimulator   Needle gauge: 22 G  Needle length: 10 cm  Needle localization: anatomical landmarks and ultrasound guidance  Assessment  Injection assessment: negative aspiration for heme, no paresthesia on injection and local visualized surrounding nerve on ultrasound  Paresthesia pain: none  Slow fractionated injection: yes  Hemodynamics: stable  Medications Administered  Ropivacaine (NAROPIN) injection 0.5%, 25 mL  Reason for block: post-op pain management and at surgeon's request

## 2021-11-04 VITALS
DIASTOLIC BLOOD PRESSURE: 76 MMHG | WEIGHT: 238 LBS | BODY MASS INDEX: 34.07 KG/M2 | OXYGEN SATURATION: 98 % | TEMPERATURE: 98.4 F | HEIGHT: 70 IN | HEART RATE: 66 BPM | RESPIRATION RATE: 16 BRPM | SYSTOLIC BLOOD PRESSURE: 121 MMHG

## 2021-11-04 LAB
ANION GAP SERPL CALCULATED.3IONS-SCNC: 9 MMOL/L (ref 7–16)
BUN BLDV-MCNC: 10 MG/DL (ref 6–20)
CALCIUM SERPL-MCNC: 8.4 MG/DL (ref 8.6–10.2)
CHLORIDE BLD-SCNC: 102 MMOL/L (ref 98–107)
CO2: 23 MMOL/L (ref 22–29)
CREAT SERPL-MCNC: 0.9 MG/DL (ref 0.7–1.2)
GFR AFRICAN AMERICAN: >60
GFR NON-AFRICAN AMERICAN: >60 ML/MIN/1.73
GLUCOSE BLD-MCNC: 132 MG/DL (ref 74–99)
HCT VFR BLD CALC: 39.9 % (ref 37–54)
HEMOGLOBIN: 14 G/DL (ref 12.5–16.5)
MCH RBC QN AUTO: 32.4 PG (ref 26–35)
MCHC RBC AUTO-ENTMCNC: 35.1 % (ref 32–34.5)
MCV RBC AUTO: 92.4 FL (ref 80–99.9)
PDW BLD-RTO: 11.6 FL (ref 11.5–15)
PLATELET # BLD: 229 E9/L (ref 130–450)
PMV BLD AUTO: 9.3 FL (ref 7–12)
POTASSIUM SERPL-SCNC: 4.1 MMOL/L (ref 3.5–5)
RBC # BLD: 4.32 E12/L (ref 3.8–5.8)
SODIUM BLD-SCNC: 134 MMOL/L (ref 132–146)
WBC # BLD: 12.3 E9/L (ref 4.5–11.5)

## 2021-11-04 PROCEDURE — 97161 PT EVAL LOW COMPLEX 20 MIN: CPT

## 2021-11-04 PROCEDURE — 6370000000 HC RX 637 (ALT 250 FOR IP): Performed by: ORTHOPAEDIC SURGERY

## 2021-11-04 PROCEDURE — 36415 COLL VENOUS BLD VENIPUNCTURE: CPT

## 2021-11-04 PROCEDURE — 85027 COMPLETE CBC AUTOMATED: CPT

## 2021-11-04 PROCEDURE — G0378 HOSPITAL OBSERVATION PER HR: HCPCS

## 2021-11-04 PROCEDURE — 96374 THER/PROPH/DIAG INJ IV PUSH: CPT

## 2021-11-04 PROCEDURE — 97165 OT EVAL LOW COMPLEX 30 MIN: CPT

## 2021-11-04 PROCEDURE — 6360000002 HC RX W HCPCS: Performed by: ORTHOPAEDIC SURGERY

## 2021-11-04 PROCEDURE — 80048 BASIC METABOLIC PNL TOTAL CA: CPT

## 2021-11-04 PROCEDURE — 96375 TX/PRO/DX INJ NEW DRUG ADDON: CPT

## 2021-11-04 PROCEDURE — 2580000003 HC RX 258: Performed by: ORTHOPAEDIC SURGERY

## 2021-11-04 RX ADMIN — Medication 10 ML: at 08:40

## 2021-11-04 RX ADMIN — LACOSAMIDE 200 MG: 100 TABLET, FILM COATED ORAL at 08:40

## 2021-11-04 RX ADMIN — Medication 2000 MG: at 08:44

## 2021-11-04 RX ADMIN — Medication 2000 MG: at 01:10

## 2021-11-04 RX ADMIN — MORPHINE SULFATE 2 MG: 2 INJECTION, SOLUTION INTRAMUSCULAR; INTRAVENOUS at 12:28

## 2021-11-04 RX ADMIN — ACETAMINOPHEN 650 MG: 325 TABLET ORAL at 08:44

## 2021-11-04 ASSESSMENT — PAIN SCALES - GENERAL
PAINLEVEL_OUTOF10: 6
PAINLEVEL_OUTOF10: 6
PAINLEVEL_OUTOF10: 2
PAINLEVEL_OUTOF10: 0

## 2021-11-04 NOTE — PROGRESS NOTES
independent   AM-PAC 6 Clicks 97/43       Pt is A & O x 3  Sensation:  Pt denies numbness and tingling to extremities    Patient education  Pt educated on PT objectives during eval and while in the hospital, use of crutches, use of knee scooter    Patient response to education:   Pt verbalized understanding Pt demonstrated skill Pt requires further education in this area    yes yes yes     ASSESSMENT:    Conditions Requiring Skilled Therapeutic Intervention:    []Decreased strength     []Decreased ROM  [x]Decreased functional mobility  [x]Decreased balance   [x]Decreased endurance   []Decreased posture  []Decreased sensation  []Decreased coordination   []Decreased vision  []Decreased safety awareness   []Increased pain       Comments:  Pt found sitting up in the chair with his mother present. No report of dizziness during functional mobility. Cueing require for safety with use of crutches. Pt able to maintain NWB right LE during ambulation. Pt then educated about use of knee scooter. No LOB while using either device. At end of eval, pt left sitting up in the chair with call light in reach and pt's mother present. Pt reported he felt he did not need to practice stair negotiation. Pt reported he has been practicing it at home already. Pt's/ family goals   1. To return home    Prognosis is good for reaching above PT goals. Patient and or family understand(s) diagnosis, prognosis, and plan of care.   yes    PHYSICAL THERAPY PLAN OF CARE:    PT POC is established based on physician order and patient diagnosis     Diagnosis:  Arthritis of right foot [M19.071]    Current Treatment Recommendations:     [] Strengthening to improve independence with functional mobility   [] ROM to improve independence with functional mobility   [x] Balance Training to improve static/dynamic balance and to reduce fall risk  [x] Endurance Training to improve activity tolerance during functional mobility   [x] Transfer Training to improve safety and independence with all functional transfers   [x] Gait Training to improve gait mechanics, endurance and assess need for appropriate assistive device  [x] Stair Training in preparation for safe discharge home and/or into the community   [] Positioning to prevent skin breakdown and contractures  [x] Safety and Education Training   [x] Patient/Caregiver Education   [] HEP  [] Other     PT long term treatment goals are located in above grid    Frequency of treatments: 2-5x/week x 1-2 weeks. Time in  1351  Time out  1406    Evaluation Time includes thorough review of current medical information, gathering information on past medical history/social history and prior level of function, completion of standardized testing/informal observation of tasks, assessment of data and education on plan of care and goals.     CPT codes:  [x] Low Complexity PT evaluation 94343  [] Moderate Complexity PT evaluation 78712  [] High Complexity PT evaluation 92757  [] PT Re-evaluation 97326  [] Therapeutic activities 06124 __minutes  [] Therapeutic exercises 34164 __ minutes      Syed Lozano, PT, DPT  PT [id number]

## 2021-11-04 NOTE — PATIENT CARE CONFERENCE
[facility] Quality Flow/Interdisciplinary Rounds Progress Note        Quality Flow Rounds held on November 4, 2021    Disciplines Attending:  Bedside Nurse, ,  and Nursing Unit Leadership    Adelita Baker was admitted on 11/3/2021 10:33 AM    Anticipated Discharge Date:  Expected Discharge Date: 11/04/21    Disposition:    Uriel Score:  Uriel Scale Score: 19    Readmission Risk              Risk of Unplanned Readmission:  0           Discussed patient goal for the day, patient clinical progression, and barriers to discharge.   The following Goal(s) of the Day/Commitment(s) have been identified:  Discharge - Obtain Order      Holden Dupree RN  November 4, 2021

## 2021-11-04 NOTE — OP NOTE
[address] [address]                                OPERATIVE REPORT    PATIENT NAME: Neil Handley                    :        1980  MED REC NO:   [id number]                            ROOM:       7729  ACCOUNT NO:   [de-identified]                           ADMIT DATE: 2021  PROVIDER:     Silvia Andrade MD    DATE OF PROCEDURE:  2021    ATTENDING SURGEON:  Silvia Andrade MD.    Archie Kim. ANESTHESIA:  General with popliteal nerve block, right lower extremity. PREOPERATIVE DIAGNOSES:  1.  Osteoarthritis, right hindfoot. 2.  Malunion, subtalar joint status post subtalar arthrodesis. 3.  Retained hardware, right foot. 4.  Equinus contracture, right ankle. POSTOPERATIVE DIAGNOSES:  1.  Osteoarthritis, right hindfoot. 2.  Malunion, subtalar joint status post subtalar arthrodesis. 3.  Retained hardware, right foot. 4.  Equinus contracture, right ankle. PROCEDURE PERFORMED:  1. Realignment triple arthrodesis, right foot using a combination of  Arthrex 7.0 and 5.0 headless screws and Arthrex Nitinol staples. 2.  Calcaneal osteotomy, right foot. 3.  Achilles tendon lengthening, right ankle. 4.  Removal of hardware, deep right foot. BLOOD LOSS:  50 mL. REPLACEMENTS:  Crystalloid. DRAINS:  Medium Hemovac. COMPLICATIONS:  None. INDICATIONS AND CONSENT:  The patient is a 48-year-old male with the  above diagnoses. Indications and risks were explained to the patient  preoperatively and consent obtained for the surgery. DESCRIPTION OF PROCEDURE:  The patient was taken to the operating room  and placed supine on the operating room table. He underwent general  anesthesia without complications. Right lower extremity was prepped and  draped in the usual sterile fashion. The patient received 2 gm of IV  Ancef preoperatively prophylactically.     Limb was exsanguinated via first removing a wedge of bone  with a base on the medial side of approximately 1.2 cm. The wedge was  removed and the osteotomy was closed down. This was held temporarily  with a large bone clamp and the position was checked fluoroscopically. Hindfoot appeared in slight varus, but clinically looked neutral.  The  forefoot remained in varus, but we were planning on addressing this with  the transverse tarsal fusion. The tendo Achillis lengthening was performed at this point due to the  inability to dorsiflex the ankle past neutral with the knee flexed or  extended. A percutaneous step-cut release was performed using a three  cut incision in the hook technique allowing intraoperative dorsiflexion  to go up to 15 degrees. A transverse incision was then made distal to  the Achilles tendon insertion and under fluoroscopic guidance, guide  pins for the large headless cannulated screws were driven across the  osteotomy site into the talar head. Once happy with the position, I  measured this with a depth gauge and after drilling over the guidewires,  the appropriate length screws were placed. I then readdressed the transverse tarsal joints and derotated the  transverse tarsal joints to neutral position. A 5.0 screw was then  placed from the navicular into the talus. Guidewires were placed first  followed by placement of the screw after pre-drilling. A morselized  bone graft with platelet-rich plasma was packed into both of these  joints as well as the small opening remaining from the osteotomy site. A Nitinol staple was then used to supplement the screw at the  talonavicular joint and then two Nitinol staples were placed in the  calcaneocuboid joint. Final x-rays demonstrated excellent realignment of the hindfoot and all  hardware of appropriate length and position. A Hemovac drain was placed and the poster tibial tendon sheath was  repaired using 2-0 Vicryl suture.   Deep fascia was closed with 2-0  Vicryl, subcutaneous tissue with 2-0 Vicryl, and skin with 3-0 nylon. The tourniquet was deflated prior to complete closure of the posterior  medial wound to assess for any arterial bleeding. A sterile dressing was then applied consisting of Adaptic, 4x4s, Kerlix,  Webril, and a well-padded posterior splint with the ankle in neutral  position. The patient tolerated the procedure well and was taken to the  Postanesthesia Care Unit in stable condition.         Estrella Swenson MD    D: 11/03/2021 16:48:09       T: 11/03/2021 16:53:33     [email or web address]  Job#: [id number]     Doc#: [id number]    CC:

## 2021-11-04 NOTE — PROGRESS NOTES
Occupational Therapy  OCCUPATIONAL THERAPY INITIAL EVALUATION  [facility] [address]    Date: 2021     Patient Name: Eduardo Fairchild  MRN: [id number]  : 1980  Room: [address] [place] [address]    Evaluating OT: Manuel Gates - [id number]    Referring Provider: Melanie Morris MD  Specific Provider Orders/Date: \"OT eval and treat\" - 2021    Diagnosis: Arthritis of right foot [M19.071]    Surgery: Patient underwent the following on 11/3/2021:  1. Realignment triple arthrodesis, right foot using a combination of  Arthrex 7.0 and 5.0 headless screws and Arthrex Nitinol staples. 2.  Calcaneal osteotomy, right foot. 3.  Achilles tendon lengthening, right ankle. 4.  Removal of hardware, deep right foot. Pertinent Medical History: seizures     Precautions: fall risk, NWB R LE    Assessment of Current Deficits:    [x] Functional mobility   [x]ADLs  [x] Strength               [x]Cognition   [x] Functional transfers   [x] IADLs         [x] Safety Awareness   [x]Endurance   [] Fine Coordination              [x] Balance      [] Vision/perception   [x]Sensation    []Gross Motor Coordination  [] ROM  [] Delirium                   [] Motor Control     OT PLAN OF CARE   OT POC is based on physician orders, patient diagnosis, and results of clinical assessment.   Frequency/Duration 2-5 days/week for 2 weeks PRN   Specific OT Treatment Interventions to Include:   * Instruction/training on adapted ADL techniques and AE recommendations to increase functional independence within precautions       * Training on energy conservation strategies, correct breathing pattern and techniques to improve independence/tolerance for self-care routine  * Functional transfer/mobility training/DME recommendations for increased independence, safety, and fall prevention  * Patient/Family education to increase follow through with safety techniques and functional independence  * Recommendation of environmental modifications for increased safety with functional transfers/mobility and ADLs  * Therapeutic exercise to improve motor endurance, ROM, and functional strength for ADLs/functional transfers  * Therapeutic activities to facilitate/challenge dynamic balance, stand tolerance for increased safety and independence with ADLs  * Neuro-muscular re-education: facilitation of righting/equilibrium reactions, midline orientation, scapular stability/mobility, normalization of muscle tone, and facilitation of volitional active controled movement    Recommended Adaptive Equipment: TBD     Home Living: Patient lives alone in a two-floor home; patient's bedroom is on the second floor. Bathroom Setup: walk-in shower (with seat available) on the main living level  Equipment Owned: N/A    Prior Level of Function (PLOF): Patient was independent with ADLs, IADLs, and functional mobility (without device) prior to this hospitalization. Patient stated that family members and his girlfriend will provide consistent assistance with ADLs/IADLs, as needed. Driving: Yes  Occupation: Patient was working at [employer] recently. Pain Level: Patient reported experiencing pain in his R LE, which he rated 8 out of 10; patient noted that nursing had recently provided him pain medication. Cognition: Patient alert and oriented x3. WFL command follow demonstrated. Patient pleasant, cooperative, and motivated to return to [facility] and home environment.   Memory: WFL  Sequencing: WFL  Problem Solving: WFL  Judgement/Safety: WFL    Functional Assessment:  AM-PAC Daily Activity Raw Score: 19/24   Initial Eval Status  Date: 11/4/2021 Treatment Status  Date:  Short Term Goals = Long Term Goals   Feeding Independent  N/A   Grooming Supervision  Mod I  (seated/standing at sink   UB Dressing Setup  Mod I  (including item retrieval)   LB Dressing SBA  Mod I / Independent - with use of AE, as needed/appropriate   Bathing SBA  Mod I / Independent - with use of AE/DME, as needed/appropriate   Toileting Supervision  Mod I / Independent   Bed Mobility  Supine-to-Sit: Independent      Functional Transfers Sit-to-Stand: Supervision   from EOB  Independent   Functional Mobility Supervision   (with walker) within patient's room. Good adherence to NWB status of R LE demonstrated. Mod I with functional mobility (with device, as needed/appropriate) in order to maximize independence with ADLs/IADLs and other functional tasks. Balance Sitting: Good  (at EOB)  Standing: Fair+  (with walker)  Good- dynamic standing balance during completion of ADLs/IADLs and other functional tasks. Activity Tolerance Fair  Patient will demonstrate Good understanding and consistent implementation of energy conservation techniques and work simplification techniques into ADL/IADL routines. Visual/  Perceptual WFL     N/A     Additional Long-Term Goal: Patient will increase functional independence to PLOF in order to allow patient to live in least restrictive environment. Strength: ROM: Additional Information:    R UE  WFL WFL    L UE WFL WFL      Hearing: WFL  Sensation: No complaints of numbness/tingling in B UEs. Tone: WFL  Edema: No    Comments: RN approved patient's participation in [address] [place] [address] activities. Upon arrival, patient supine in bed. At end of session, patient seated in bedside chair with call light and phone within reach, family member present, and all lines and tubes intact. Patient would benefit from continued skilled OT to increase safety and independence with completion of ADL/IADL tasks for functional independence and quality of life. Patient education provided regardin) implications of NWB status of R LE on ADLs,IADLs, and functional transfers/mobility, 2) techniques to maximize safety with use of walker, 3) importance of having assistance with ADLs/IADLs, as needed, upon discharge to ensure safety. Patient verbalized understanding. Further skilled OT treatment indicated to increase patient's safety and independence with completion of ADL/IADL tasks in order to maximize patient's functional independence and quality of life. Rehab Potential: Good for established goals. Patient / Family Goal: Patient noted that he anticipates returning home upon discharge. Patient and/or family were instructed on functional diagnosis, prognosis/goals, and OT plan of care. Demonstrated Good understanding. Eval Complexity: Low    Time In: 1329  Time Out: 1344  Total Treatment Time: 0 minutes      Minutes Units   OT Eval Low 79323 15 1   OT Eval Medium 64464     OT Eval High 53741     OT Re-Eval [id number]     Therapeutic Ex 11009     Therapeutic Activities 72811     ADL/Self Care 50328     Orthotic Management 84993     Neuro Re-Ed 12841     Non-Billable Time N/A ---     Evaluation time includes thorough review of current medical information, gathering information on past medical history/social history and prior level of function, completion of standardized testing/informal observation of tasks, assessment of data, and education on plan of care and goals. Kavita García OTR/L  License Number: [id number]

## 2021-11-04 NOTE — PROGRESS NOTES
POD#1  S-c/o mild pain  O-dressing dry. Toes with good cap refill. Block still in effect   HV removed--deflated  A/P-PT eval today.   Anticipate discharge home after noon

## 2021-11-08 NOTE — DISCHARGE SUMMARY
Physician Discharge Summary     Patient ID:  Arlin Dacosta  [id number]  56 y.o.  1980    Admit date: 11/3/2021    Discharge date and time: 11/4/2021  3:20 PM     Admitting Physician: Kassidy Wood MD     Discharge Physician: Kassidy Wood MD    Admission Diagnoses: Arthritis of right foot [M19.071]    Discharge Diagnoses: s/p right foot arthritis    Admission Condition: stable    Discharged Condition: stable    Hospital Course: The patient was admitted from the [facility] area/ OR. After examination, review of radiologic studies, and appropriate pre-operative risk assessment, it was recommended by Kassidy Wood MD to undergo triple arthrodesis, removal of right foot hardware, calcaneal osteotomy, and achilles tendon lengthening. The patient underwent an uneventful course of above procedure by Kassidy Wood MD on 11/3/21. The patient was subsequently taken to the PACU and the floor in stable condition. The patient was continued on antibiotics for 24 hours post-operatively as they received a dose of antibiotics pre-operatively as well. The patient was instructed to be NWB RLE.  was consulted for d/c planning. On POD 1, after the patient had made appropriate gains in regaining independent function with physical therapy, the patient was discharged to home in stable condition. Treatments: s/p right foot removal of hardware, triple arthrodesis, achilles tendon lengthening, and calcaneal osteotomy    Disposition: Patient was discharge to home in stable condition. The patient was provided instructions to continue pain medication as prescribed, and to continue ice and elevation with keeping splint dry. The patient was to follow up with Kassidy Wood MD in 2 weeks, and to call the office for an appointment. suture (s) were to be removed on within 2 weeks.        Medication List      START taking these medications    HYDROcodone-acetaminophen 5-325 MG per tablet  Commonly known as: Norco  Take 1 tablet by mouth every 6 hours as needed for Pain for up to 7 days. Intended supply: 7 days.  Take lowest dose possible to manage pain        CONTINUE taking these medications    EQUATE PO     lacosamide 100 MG Tabs tablet  Commonly known as: Vimpat  TAKE TWO TABLETS BY MOUTH TWICE DAILY           Where to Get Your Medications      You can get these medications from any pharmacy    Bring a paper prescription for each of these medications  · HYDROcodone-acetaminophen 5-325 MG per tablet         Signed:  Cheli Pineda DO  11/8/2021  1:55 PM

## 2023-05-13 ENCOUNTER — HOSPITAL ENCOUNTER (EMERGENCY)
Age: 43
Discharge: HOME | End: 2023-05-13
Attending: EMERGENCY MEDICINE
Payer: COMMERCIAL

## 2023-05-13 VITALS
RESPIRATION RATE: 16 BRPM | DIASTOLIC BLOOD PRESSURE: 80 MMHG | BODY MASS INDEX: 28.7 KG/M2 | TEMPERATURE: 97.9 F | SYSTOLIC BLOOD PRESSURE: 128 MMHG | WEIGHT: 200 LBS | HEART RATE: 62 BPM | OXYGEN SATURATION: 98 %

## 2023-05-13 DIAGNOSIS — S05.00XA CORNEAL ABRASION, UNSPECIFIED LATERALITY, INITIAL ENCOUNTER: Primary | ICD-10-CM

## 2023-05-13 PROCEDURE — 6360000002 HC RX W HCPCS: Performed by: EMERGENCY MEDICINE

## 2023-05-13 PROCEDURE — 90714 TD VACC NO PRESV 7 YRS+ IM: CPT | Performed by: EMERGENCY MEDICINE

## 2023-05-13 PROCEDURE — 90471 IMMUNIZATION ADMIN: CPT | Performed by: EMERGENCY MEDICINE

## 2023-05-13 PROCEDURE — 99284 EMERGENCY DEPT VISIT MOD MDM: CPT

## 2023-05-13 PROCEDURE — 6370000000 HC RX 637 (ALT 250 FOR IP): Performed by: EMERGENCY MEDICINE

## 2023-05-13 RX ORDER — TETRACAINE HYDROCHLORIDE 5 MG/ML
SOLUTION OPHTHALMIC
Status: DISCONTINUED
Start: 2023-05-13 | End: 2023-05-13 | Stop reason: HOSPADM

## 2023-05-13 RX ORDER — TOBRAMYCIN 3 MG/ML
1 SOLUTION/ DROPS OPHTHALMIC EVERY 4 HOURS
Qty: 3 ML | Refills: 0 | Status: SHIPPED | OUTPATIENT
Start: 2023-05-13 | End: 2023-05-23

## 2023-05-13 RX ORDER — TETANUS AND DIPHTHERIA TOXOIDS ADSORBED 2; 2 [LF]/.5ML; [LF]/.5ML
0.5 INJECTION INTRAMUSCULAR ONCE
Status: COMPLETED | OUTPATIENT
Start: 2023-05-13 | End: 2023-05-13

## 2023-05-13 RX ORDER — ERYTHROMYCIN 5 MG/G
OINTMENT OPHTHALMIC ONCE
Status: COMPLETED | OUTPATIENT
Start: 2023-05-13 | End: 2023-05-13

## 2023-05-13 RX ADMIN — ERYTHROMYCIN: 5 OINTMENT OPHTHALMIC at 19:24

## 2023-05-13 RX ADMIN — TETANUS AND DIPHTHERIA TOXOIDS ADSORBED 0.5 ML: 2; 2 INJECTION INTRAMUSCULAR at 19:24

## 2023-05-13 ASSESSMENT — PAIN DESCRIPTION - LOCATION
LOCATION: EYE
LOCATION: EYE

## 2023-05-13 ASSESSMENT — PAIN - FUNCTIONAL ASSESSMENT
PAIN_FUNCTIONAL_ASSESSMENT: 0-10
PAIN_FUNCTIONAL_ASSESSMENT: 0-10
PAIN_FUNCTIONAL_ASSESSMENT: PREVENTS OR INTERFERES SOME ACTIVE ACTIVITIES AND ADLS

## 2023-05-13 ASSESSMENT — PAIN DESCRIPTION - FREQUENCY
FREQUENCY: CONTINUOUS
FREQUENCY: CONTINUOUS

## 2023-05-13 ASSESSMENT — PAIN DESCRIPTION - ONSET: ONSET: ON-GOING

## 2023-05-13 ASSESSMENT — LIFESTYLE VARIABLES
HOW MANY STANDARD DRINKS CONTAINING ALCOHOL DO YOU HAVE ON A TYPICAL DAY: PATIENT DOES NOT DRINK
HOW OFTEN DO YOU HAVE A DRINK CONTAINING ALCOHOL: NEVER

## 2023-05-13 ASSESSMENT — PAIN DESCRIPTION - ORIENTATION
ORIENTATION: RIGHT
ORIENTATION: RIGHT

## 2023-05-13 ASSESSMENT — PAIN DESCRIPTION - PAIN TYPE
TYPE: ACUTE PAIN
TYPE: ACUTE PAIN

## 2023-05-13 ASSESSMENT — PAIN DESCRIPTION - DESCRIPTORS: DESCRIPTORS: TENDER

## 2023-05-13 ASSESSMENT — PAIN SCALES - GENERAL
PAINLEVEL_OUTOF10: 8
PAINLEVEL_OUTOF10: 3

## 2023-05-13 NOTE — ED NOTES
Pt given discharge summary with education on corneal abrasion, pt also aware of prescription for eyedrops awaiting at his assigned pharmacy, and pt given info to ophthalmologist for eval regarding above diagnosis     Lux Kaiser RN  05/13/23 1942

## 2023-05-13 NOTE — ED PROVIDER NOTES
HPI:  5/13/23, Time: 7:01 PM EDT        Anais Marroquin is a 36 y.o. male presenting to the ED for redness and irritation of the right eye, beginning 2 days ago. The complaint has been persistent, mild in severity, and worsened by nothing. No history of any known foreign body exposures. No wood shaving devices or nor any metal grinding devices. Positive tearing    Review of Systems:   A complete review of systems was performed and pertinent positives and negatives are stated within HPI, all other systems reviewed and are negative.    --------------------------------------------- PAST HISTORY ---------------------------------------------  Past Medical History:  has a past medical history of Seizures ([facility] [address] Past Surgical History:  has a past surgical history that includes Ankle surgery (Right); Colonoscopy (N/A, 5/3/2021); Colonoscopy (5/3/2021); and Ankle surgery (Right, 11/3/2021). Social History:  reports that he quit smoking about 19 months ago. His smoking use included cigarettes. He has a 18.00 pack-year smoking history. He has never used smokeless tobacco. He reports current alcohol use. He reports current drug use. Family History: family history includes Diabetes in his maternal grandmother and maternal uncle. The patients home medications have been reviewed. Allergies: Patient has no known allergies. -------------------------------------------------- RESULTS -------------------------------------------------  All laboratory and radiology results have been personally reviewed by myself   LABS:  No results found for this visit on 05/13/23. RADIOLOGY:  Interpreted by Radiologist.  No orders to display       ------------------------- NURSING NOTES AND VITALS REVIEWED ---------------------------    The nursing notes within the ED encounter and vital signs as below have been reviewed.    /80   Pulse 62   Temp 97.9 °F (36.6 °C)   Resp 16   Wt 200 lb (90.7 kg)   SpO2 98%   BMI

## 2023-05-13 NOTE — DISCHARGE INSTRUCTIONS
NOTE:  Apply the eye ointment provided at nighttime and prior to sleeping. Apply the prescribed eyedrops every 4 hours. Call the eye specialist (Dr. Parviz Chandra) for follow-up within 2 to 3 days unless her symptoms are completely resolved.

## (undated) DEVICE — DRESSING PETRO W3XL8IN OIL EMUL N ADH GZ KNIT IMPREG CELOS

## (undated) DEVICE — YANKAUER,OPEN TIP,W/O VENT,STERILE: Brand: MEDLINE INDUSTRIES, INC.

## (undated) DEVICE — PADDING UNDERCAST W6INXL4YD WYTEX 6 PER BG

## (undated) DEVICE — BNDG,ELSTC,MATRIX,STRL,4"X5YD,LF,HOOK&LP: Brand: MEDLINE

## (undated) DEVICE — RONGEURS PITUITARY

## (undated) DEVICE — GOWN,SIRUS,FABRNF,XL,20/CS: Brand: MEDLINE

## (undated) DEVICE — PAD,ABDOMINAL,5"X9",ST,LF,25/BX: Brand: MEDLINE INDUSTRIES, INC.

## (undated) DEVICE — GAUZE,SPONGE,4"X4",8PLY,STRL,LF,10/TRAY: Brand: MEDLINE

## (undated) DEVICE — GLOVE ORANGE PI 7   MSG9070

## (undated) DEVICE — INSTRUMENT ANGLED CURRETTES REUSABLE

## (undated) DEVICE — DOUBLE BASIN SET: Brand: MEDLINE INDUSTRIES, INC.

## (undated) DEVICE — KIT EVAC 400CC DIA1/8IN H PAT 12.5IN 3 SPR RND SHP PVC DRN

## (undated) DEVICE — PADDING,UNDERCAST,COTTON, 4"X4YD STERILE: Brand: MEDLINE

## (undated) DEVICE — GAUZE,SPONGE,POST-OP,4X3,STRL,LF: Brand: MEDLINE

## (undated) DEVICE — DRAPE,REIN 53X77,STERILE: Brand: MEDLINE

## (undated) DEVICE — CONVERTORS STOCKINETTE: Brand: CONVERTORS

## (undated) DEVICE — CONNECTOR IRRIGATION AUXILIARY H2O JET W/ PRT MTL THRD HYDR

## (undated) DEVICE — DRESSING,GAUZE,XEROFORM,CURAD,5"X9",ST: Brand: CURAD

## (undated) DEVICE — INSTRUMENT SYSTEM 7 BATTERY REUSABLE

## (undated) DEVICE — DRAPE C ARM W41XL74IN UNIV MOB W RUBBERBAND CLP

## (undated) DEVICE — PADDING CAST W6INXL4YD COT LO LINTING WYTEX

## (undated) DEVICE — TRAY ORTHO1 REUSABLE

## (undated) DEVICE — TUBING, SUCTION, 9/32" X 10', STRAIGHT: Brand: MEDLINE

## (undated) DEVICE — SET PSI

## (undated) DEVICE — FORCEPS BX L240CM JAW DIA2.4MM ORNG L CAP W/ NDL DISP RAD

## (undated) DEVICE — GLOVE SURG SZ 85 L12IN FNGR THK94MIL TRNSLUC YEL LTX

## (undated) DEVICE — ELECTRODE PT RET AD L9FT HI MOIST COND ADH HYDRGEL CORDED

## (undated) DEVICE — GUIDEWIRE SURG L9.25IN DIA0.92IN THRD W/ TRCR TIP

## (undated) DEVICE — INTENDED FOR TISSUE SEPARATION, AND OTHER PROCEDURES THAT REQUIRE A SHARP SURGICAL BLADE TO PUNCTURE OR CUT.: Brand: BARD-PARKER ® STAINLESS STEEL BLADES

## (undated) DEVICE — K WIRE FIX L6IN DIA0.062IN 1600662] MICROAIRE SURGICAL INSTRUMENTS INC]
Type: IMPLANTABLE DEVICE | Site: ANKLE | Status: NON-FUNCTIONAL
Removed: 2021-11-03

## (undated) DEVICE — SET ORTHO MINI STD

## (undated) DEVICE — BUR SURG L70MM HD L8MM DIA4MM STR SHANK 235MM 8 FLUT MIC

## (undated) DEVICE — GUIDEWIRE ARTHSCP L9.25IN 0.62IN THRD W/ TRCR TIP

## (undated) DEVICE — 4-PORT MANIFOLD: Brand: NEPTUNE 2

## (undated) DEVICE — READY WET SKIN SCRUB TRAY-LF: Brand: MEDLINE INDUSTRIES, INC.

## (undated) DEVICE — Device

## (undated) DEVICE — 3M™ COBAN™ NL STERILE NON-LATEX SELF-ADHERENT WRAP, 2084S, 4 IN X 5 YD (10 CM X 4,5 M), 18 ROLLS/CASE: Brand: 3M™ COBAN™

## (undated) DEVICE — TRAY DRILL SYSTEM 4 REUSABLE

## (undated) DEVICE — CATHETER SCLERO L240CM NDL 25GA L4MM SHTH DIA2.3MM CNTRST

## (undated) DEVICE — BNDG,ELSTC,MATRIX,STRL,6"X5YD,LF,HOOK&LP: Brand: MEDLINE

## (undated) DEVICE — 3M™ STERI-DRAPE™ U-DRAPE 1015: Brand: STERI-DRAPE™

## (undated) DEVICE — BANDAGE COMPR W6INXL12FT SMOOTH FOR LIMB EXSANG ESMARCH

## (undated) DEVICE — 2108 SERIES SAGITTAL BLADE, OFFSET (20.0 X 0.89 X 80.0MM)

## (undated) DEVICE — SPLINT ORTH W5XL30IN PLSTR OF PARIS FAST IMMOB OF FRAC HI

## (undated) DEVICE — Z DISCONTINUED NO SUB IDED TUBING ETCO2 AD L6.5FT NSL ORAL CVD PRNG NONFLARED TIP OVR

## (undated) DEVICE — TRAY LAMBOTS REUSABLE

## (undated) DEVICE — SYSTEM TPS ORTHO

## (undated) DEVICE — INSTRUMENT SYSTEM 4 BATTERY REUSABLE

## (undated) DEVICE — DECANTER: Brand: UNBRANDED

## (undated) DEVICE — MARKER,SKIN,WI/RULER AND LABELS: Brand: MEDLINE

## (undated) DEVICE — CONTAINER SPEC 60ML PH 7NEUTRAL BUFF FRMLN RDY TO USE

## (undated) DEVICE — DRAPE,EXTREMITY,89X128,STERILE: Brand: MEDLINE

## (undated) DEVICE — TOWEL,OR,DSP,ST,BLUE,STD,6/PK,12PK/CS: Brand: MEDLINE

## (undated) DEVICE — PACK PROCEDURE SURG GEN CUST

## (undated) DEVICE — DEFENDO AIR WATER SUCTION AND BIOPSY VALVE KIT FOR  OLYMPUS: Brand: DEFENDO AIR/WATER/SUCTION AND BIOPSY VALVE

## (undated) DEVICE — TUBING SUCT 12FR MAL ALUM SHFT FN CAP VENT UNIV CONN W/ OBT

## (undated) DEVICE — COVER,MAYO STAND,STERILE: Brand: MEDLINE